# Patient Record
(demographics unavailable — no encounter records)

---

## 2024-12-09 NOTE — HISTORY OF PRESENT ILLNESS
[de-identified] : PATIENT COMPLAINS OF LEFT SHOUDLER PAIN    WHAT IS YOUR DOMINANT HAND - RHD  PAIN BEGAN A YEAR AGO - 2023 IS YOUR PROBLEM GETTING WORSE: FEELS THE SAME  RELATED INJURY / ACCIDENT - NO IS THIS WORKERS COMPENSATION INJURY - NO  PAIN    5/10, DESCRIPTION OF PAIN - SHARP WORSE AT PARTICULAR TIME OF DAY - NO  WHAT ACTIVITIES MAKE IT WORSE - NO  ASSOCIATED SYMPTOMS - SORENESS  PREVIOUS DISLOCATION - NO  PRIOR TREATMENT - PATIENT STATES SHE HAD A FROZEN SHOULDER- SEPTEMBER 2013 RECEIVED CORTISONE INJ THAT WAS HELPFUL    RADIATES UP THE NECK  ASSOCIATED SX: NONE   HAS HAD PREVIOUS IMAGING - NO HAS HAD PHYSICAL THERAPY WITHOUT RELIEF- YES - HELPFUL HAS HAD PREVIOUS SURGERY - YES  HAS HAD PREVIOUS INJECTION - YES

## 2024-12-09 NOTE — PROCEDURE
[de-identified] : INJECTION LEFT SHOULDER SA SPACE  Patient has demonstrated limited relief from NSAIDS, rest, exercises / PT, and after discussion of the risks and benefits, the patient has elected to proceed with an ULTRASOUND GUIDED injection into the LEFT SUBACROMIAL  SPACE LATERAL APPROACH    Confirmed that the patient does not have history of prior adverse reactions, active, infections, or relevant allergies. There was no effusion, erythema, or warmth, and the skin was clear  The skin was sterilized with alcohol. Ethyl Chloride was used as a topical anesthetic. Routine sterile technique.  The site was injected UTILIZING ULTRASOUND GUIDANCE to confirm appropriate placement of the needle- with a mixture of medication and local anesthetic. The injection was completed without complication and a bandage was applied.   The patient tolerated the procedure well and was given post-injection instructions.Rec: Cold therapy, analgesics, avoid heavy activity. MEDICATION: 4cc of 1% xylocaine + 10mg of Kenalog LOT# [id number] EXP MARCH 2026

## 2024-12-09 NOTE — DISCUSSION/SUMMARY
[de-identified] : ULTRASOUND SHOULDER PERFORMED , EVALUATED, DOCUMENTED - AND REVIEWED WITH PATIENT EVALUATION  REVEALS INFLAMMATORY CHANGES WITHOUT SIGNIFICANT TENDON TEAR  PATIENT HAS ELECTED TO PROCEED WITH KENALOG INJECTION SHOULDER RISKS AND BENEFITS DISCUSSED - VERBAL CONSENT OBTAINED SEE PROCEDURE NOTE     POST INJECTION INSTRUCTIONS:   INJECTION THERAPY HANDOUT PROVIDED   COLD THERAPY , ANALGESICS PRN   HOME  EXERCISES QD -  PENDULUM AND ROM  HANDOUT PROVIDED, REVIEWED AND DEMONSTRATED - REFERRED TO INSTRUCTIONAL VIDEO ON MY WEBSITE ROTATOR CUFF INJURY/TENDONITIS  HANDOUT WITH EXERCISES   START P.T.  WITHIN 2 WEEKS AFTER INJECTION - 2 X 4 WEEKS - PROGRESS TO HOME EXERCISES   CONSIDER REPEAT INJECTION AFTER P.T.    MRI IF NO RELIEF 12 WEEKS

## 2024-12-09 NOTE — PHYSICAL EXAM
[de-identified] : PHYSICAL EXAM LEFT  SHOULDER  NECK EXAM  FROM NONTENDER  SPURLING  RIGHT=NEG, LEFT=NEG  MODERATE SCAPULAR PROTRACTION  AROM 110 / 110 / 70 / 15 TENDER: SA REGION  SPECIAL TESTING : PERRY - POSITIVE  RAMIRO - POSITIVE  SPEED TEST - POSITIVE  BOSE - NEGATIVE  APPREHENSION AND SUPPRESSION - NEGATIVE   RC STRENGTH TESTING  SS:  5/5 SUB 5/5 IS     5/5 BICEPS  5/5  SENSATION  - GROSSLY INTACT   [de-identified] : I ORDERED XRAYS OF SHOULDER TO EVALUATE PAINFUL SYMPTOMS  LEFT SHOULDER XRAY (2 VIEWS - AP AND OUTLET) -  NO OBVIOUS FRACTURE , SEPARATION OR DISLOCATION GRADE 1 OSTEOARTHRITIS, TYPE 1-2B ACROMION CSA= 30.9

## 2025-01-22 NOTE — END OF VISIT
[FreeTextEntry3] :   This note was written by Josephine Jones PA-C, acting as a scribe for Dr. Alexis Solomon. I, Dr. Alexis Solomon, have read and attest that all the information, medical decision-making, and discharge instructions within are true and accurate.  I, Dr. Alexis Solomon, personally performed the evaluation and management (E/M) services for this new patient. That E/M includes conducting the initial examination, assessing all conditions, and establishing the plan of care. Today, my ACP, Josephine Jones PA-C, was here to observe my evaluation and management services for this patient to be followed going forward.

## 2025-01-22 NOTE — PHYSICAL EXAM
[de-identified] : General: patient is well developed, well nourished, in no acute  distress, alert and oriented x 3.   Mood and affect: normal  Respiratory: no respiratory distress noted  Alignment:The spine is well compensated in the coronal and sagittal plane.   Gait: The patient is able to toe walk and heel walk without difficulty.   Palpation: no tenderness to palpation spine or paraspinal region  Range of motion: Lumbar spine ROM is full  Neurologic Exam: Motor: Manual Muscle testing in the lower extremities is 5 out of 5 in all muscle groups. There is no evidence of muscular atrophy in the lower extremities. Sensory: Sensation to light touch is grossly intact in the lower extremities  Reflexes: DTR are present and symmetric throughout, negative krishnamurthy bilaterally, negative inverted radial reflex bilaterally, no clonus, plantar responses are flexor  Hip Exam: No pain with internal or external rotation of hips bilaterally  Special tests: Straight leg raise test negative.  Cross straight leg test negative.  [de-identified] : xr full spine ap/lateral, lumbar flexion/extension 1/22/25 (my read): multilevel degenerative changes, exaggerated thoracic kyphosis; no listhesis or dynamic instability, no scoliosis, no acute fractures seen

## 2025-01-22 NOTE — HISTORY OF PRESENT ILLNESS
[de-identified] : Ms. SANON is a very pleasant 85 year old female who complains of chronic low back pain into both buttocks and right posterior thigh. Is currently in PT and doing home exercises. Wants to better understand diagnosis and how to direct PT.  The patient no history of previous spine surgery.   The patient has no history of unexpected weight loss, no history of active cancer, no history bladder or bowel dysfunction, no night pain, no fevers or chills.   The past medical history, surgical history, family history, allergies, medications, 10+ point review of systems, family history and social history were reviewed and non contributory.

## 2025-01-22 NOTE — DISCUSSION/SUMMARY
[de-identified] : Discussed my findings with the patient. I am recommending an mri lumbar spine without contrast and an order was given. Follow up with me after imaging is completed, sooner if there is an issue. All questions answered.

## 2025-02-05 NOTE — HISTORY OF PRESENT ILLNESS
[de-identified] : Follow up 2/5/25: Patient returns for follow up. No new neurologic symptoms. Here to review mri imaging.   Initial 1/22/25: Ms. SANON is a very pleasant 85 year old female who complains of chronic low back pain into both buttocks and right posterior thigh. Is currently in PT and doing home exercises. Wants to better understand diagnosis and how to direct PT.  The patient no history of previous spine surgery.   The patient has no history of unexpected weight loss, no history of active cancer, no history bladder or bowel dysfunction, no night pain, no fevers or chills.   The past medical history, surgical history, family history, allergies, medications, 10+ point review of systems, family history and social history were reviewed and non contributory.

## 2025-02-05 NOTE — PHYSICAL EXAM
[de-identified] : General: patient is well developed, well nourished, in no acute  distress, alert and oriented x 3.   Mood and affect: normal  Respiratory: no respiratory distress noted  Alignment:The spine is well compensated in the coronal and sagittal plane.   Gait: The patient is able to toe walk and heel walk without difficulty.   Palpation: no tenderness to palpation spine or paraspinal region  Range of motion: Lumbar spine ROM is full  Neurologic Exam: Motor: Manual Muscle testing in the lower extremities is 5 out of 5 in all muscle groups. There is no evidence of muscular atrophy in the lower extremities. Sensory: Sensation to light touch is grossly intact in the lower extremities  Reflexes: DTR are present and symmetric throughout, negative krishnamurthy bilaterally, negative inverted radial reflex bilaterally, no clonus, plantar responses are flexor  Hip Exam: No pain with internal or external rotation of hips bilaterally  Special tests: Straight leg raise test negative.  Cross straight leg test negative.  [de-identified] : mri lumbar spine without contrast 1/23/25 (my read): L5/S1 disc osteophyte and facet hypertrophy with moderate/severe right foraminal stenosis; L4/L5 disc degeneration, disc osteophyte and facet hypertrophy with moderate lateral recess stenosis;   xr full spine ap/lateral, lumbar flexion/extension 1/22/25 (my read): multilevel degenerative changes, exaggerated thoracic kyphosis; no listhesis or dynamic instability, no scoliosis, no acute fractures seen

## 2025-02-05 NOTE — DISCUSSION/SUMMARY
[de-identified] : Discussed my findings with the patient. Imaging reviewed and treatment options discussed. Updated order for PT was given, focus on flexion protocol. She was also given contact information for Dr. Alicea, can consider right L5/S1 JOHANNE. She will follow up with me as needed. All questions answered.
